# Patient Record
Sex: MALE | URBAN - METROPOLITAN AREA
[De-identification: names, ages, dates, MRNs, and addresses within clinical notes are randomized per-mention and may not be internally consistent; named-entity substitution may affect disease eponyms.]

---

## 2018-08-30 ENCOUNTER — OFFICE VISIT (OUTPATIENT)
Dept: URBAN - METROPOLITAN AREA CLINIC 76 | Facility: CLINIC | Age: 83
End: 2018-08-30
Payer: MEDICARE

## 2018-08-30 DIAGNOSIS — E10.9 TYPE 1 DIABETES MELLITUS W/O COMPLICATION: ICD-10-CM

## 2018-08-30 DIAGNOSIS — Z96.1 PRESENCE OF INTRAOCULAR LENS: ICD-10-CM

## 2018-08-30 DIAGNOSIS — H35.372 PUCKERING OF MACULA, LEFT EYE: ICD-10-CM

## 2018-08-30 DIAGNOSIS — H35.341 MACULAR CYST, HOLE, OR PSEUDOHOLE, RIGHT EYE: ICD-10-CM

## 2018-08-30 DIAGNOSIS — H43.813 VITREOUS DEGENERATION, BILATERAL: ICD-10-CM

## 2018-08-30 DIAGNOSIS — H35.3131 NONEXUDATIVE AGE-RELATED MACULAR DEGENERATION, BILATERAL, EARLY DRY STAGE: ICD-10-CM

## 2018-08-30 PROCEDURE — 92015 DETERMINE REFRACTIVE STATE: CPT | Performed by: OPTOMETRIST

## 2018-08-30 PROCEDURE — 92014 COMPRE OPH EXAM EST PT 1/>: CPT | Performed by: OPTOMETRIST

## 2018-08-30 ASSESSMENT — INTRAOCULAR PRESSURE
OD: 14
OS: 16

## 2018-08-30 ASSESSMENT — VISUAL ACUITY
OS: 20/30
OD: 20/60

## 2018-08-30 NOTE — IMPRESSION/PLAN
Impression: Nonexudative age-related macular degeneration, bilateral, early dry stage: H35.3131. OU. Plan: Discussed diagnosis in detail with patient. Counseling about the benefits and/or risks of the Age-Related Eye Disease Study (AREDS) formulation for preventing progression of age-related macular degeneration (AMD), add lutein 20-30 mg, zeoxanthin 2-5mg per day,  was provided to the patient and/or caregiver(s). Will continue to observe condition and or symptoms. Call if [address] worsens. Dispensed and use of [employer] grid was explained.

## 2018-08-30 NOTE — IMPRESSION/PLAN
Impression: Presbyopia: H52.4. OU. Plan: Discussed diagnosis with patient. Pt very inconsistent with refraction and eyes are very dry, Recommend pt comes back in 1 month to do refraction before giving rx.

## 2018-09-27 ENCOUNTER — TESTING ONLY (OUTPATIENT)
Dept: URBAN - METROPOLITAN AREA CLINIC 76 | Facility: CLINIC | Age: 83
End: 2018-09-27

## 2018-09-27 DIAGNOSIS — H52.4 PRESBYOPIA: Primary | ICD-10-CM

## 2018-09-27 PROCEDURE — V2799 MISC VISION ITEM OR SERVICE: HCPCS | Performed by: OPTOMETRIST

## 2018-09-27 ASSESSMENT — VISUAL ACUITY
OD: 20/30
OS: 20/30

## 2018-09-27 NOTE — IMPRESSION/PLAN
Impression: Presbyopia: H52.4 OU. Plan: Discussed diagnosis with patient. Dispensed new MRx today with prism in OD.
